# Patient Record
Sex: FEMALE | Race: WHITE | NOT HISPANIC OR LATINO
[De-identification: names, ages, dates, MRNs, and addresses within clinical notes are randomized per-mention and may not be internally consistent; named-entity substitution may affect disease eponyms.]

---

## 2017-06-16 ENCOUNTER — APPOINTMENT (OUTPATIENT)
Dept: ENDOCRINOLOGY | Facility: CLINIC | Age: 77
End: 2017-06-16

## 2017-06-16 VITALS
HEIGHT: 64.5 IN | DIASTOLIC BLOOD PRESSURE: 82 MMHG | SYSTOLIC BLOOD PRESSURE: 130 MMHG | WEIGHT: 119.38 LBS | BODY MASS INDEX: 20.13 KG/M2 | HEART RATE: 62 BPM

## 2017-06-16 RX ORDER — LORAZEPAM 1 MG/1
1 TABLET ORAL
Qty: 30 | Refills: 0 | Status: ACTIVE | COMMUNITY
Start: 2017-05-11

## 2017-06-16 RX ORDER — VALACYCLOVIR 1 G/1
1 TABLET, FILM COATED ORAL
Qty: 30 | Refills: 0 | Status: ACTIVE | COMMUNITY
Start: 2017-05-11

## 2017-06-27 ENCOUNTER — OUTPATIENT (OUTPATIENT)
Dept: OUTPATIENT SERVICES | Facility: HOSPITAL | Age: 77
LOS: 1 days | End: 2017-06-27
Payer: MEDICARE

## 2017-06-27 PROCEDURE — 77080 DXA BONE DENSITY AXIAL: CPT | Mod: 26

## 2017-06-27 PROCEDURE — 76536 US EXAM OF HEAD AND NECK: CPT

## 2017-06-27 PROCEDURE — 76536 US EXAM OF HEAD AND NECK: CPT | Mod: 26

## 2017-06-27 PROCEDURE — 77080 DXA BONE DENSITY AXIAL: CPT

## 2017-06-27 PROCEDURE — 77081 DXA BONE DENSITY APPENDICULR: CPT

## 2017-06-28 LAB
25(OH)D3 SERPL-MCNC: 31.4 NG/ML
HBA1C MFR BLD HPLC: 5.5 %
T3 SERPL-MCNC: 73 NG/DL
T4 FREE SERPL-MCNC: 1.4 NG/DL
TSH SERPL-ACNC: 3.23 UIU/ML

## 2017-07-26 ENCOUNTER — LABORATORY RESULT (OUTPATIENT)
Age: 77
End: 2017-07-26

## 2017-07-26 ENCOUNTER — APPOINTMENT (OUTPATIENT)
Dept: ENDOCRINOLOGY | Facility: CLINIC | Age: 77
End: 2017-07-26

## 2017-07-26 VITALS
DIASTOLIC BLOOD PRESSURE: 81 MMHG | SYSTOLIC BLOOD PRESSURE: 136 MMHG | HEART RATE: 55 BPM | WEIGHT: 119.13 LBS | HEIGHT: 64.5 IN | BODY MASS INDEX: 20.09 KG/M2

## 2017-07-26 DIAGNOSIS — Z13.820 ENCOUNTER FOR SCREENING FOR OSTEOPOROSIS: ICD-10-CM

## 2017-07-26 RX ORDER — MULTIVIT-MIN/FOLIC/VIT K/LYCOP 400-300MCG
25 MCG TABLET ORAL DAILY
Qty: 30 | Refills: 5 | Status: ACTIVE | COMMUNITY
Start: 2017-07-26

## 2017-07-26 RX ORDER — ZOLPIDEM TARTRATE 10 MG/1
10 TABLET ORAL
Qty: 30 | Refills: 0 | Status: DISCONTINUED | COMMUNITY
Start: 2017-04-20 | End: 2017-07-26

## 2017-07-27 LAB
ANION GAP SERPL CALC-SCNC: 25 MMOL/L
BUN SERPL-MCNC: 15 MG/DL
CALCIUM SERPL-MCNC: 10.7 MG/DL
CALCIUM SERPL-MCNC: 10.7 MG/DL
CHLORIDE SERPL-SCNC: 104 MMOL/L
CO2 SERPL-SCNC: 18 MMOL/L
CREAT SERPL-MCNC: 0.76 MG/DL
GLUCOSE SERPL-MCNC: 89 MG/DL
PARATHYROID HORMONE INTACT: 44 PG/ML
POTASSIUM SERPL-SCNC: 4.9 MMOL/L
SODIUM SERPL-SCNC: 147 MMOL/L
T3 SERPL-MCNC: 95 NG/DL
TSH SERPL-ACNC: 0.84 UIU/ML

## 2017-07-31 LAB — PTH RELATED PROT SERPL-MCNC: <1.1 PMOL/L

## 2017-08-04 LAB
ANION GAP SERPL CALC-SCNC: 17 MMOL/L
BUN SERPL-MCNC: 19 MG/DL
CALCIUM SERPL-MCNC: 10 MG/DL
CALCIUM SERPL-MCNC: 10 MG/DL
CHLORIDE SERPL-SCNC: 102 MMOL/L
CO2 SERPL-SCNC: 23 MMOL/L
CREAT SERPL-MCNC: 0.77 MG/DL
GLUCOSE SERPL-MCNC: 92 MG/DL
PARATHYROID HORMONE INTACT: 64 PG/ML
POTASSIUM SERPL-SCNC: 4.6 MMOL/L
SODIUM SERPL-SCNC: 142 MMOL/L
VIT B1 SERPL-MCNC: 116.5 NMOL/L
VIT B12 SERPL-MCNC: 888 PG/ML
VIT B6 SERPL-MCNC: 7 UG/L

## 2017-09-18 ENCOUNTER — RX RENEWAL (OUTPATIENT)
Age: 77
End: 2017-09-18

## 2017-10-20 RX ORDER — ALENDRONATE SODIUM 70 MG/1
70 TABLET ORAL
Qty: 4 | Refills: 3 | Status: DISCONTINUED | COMMUNITY
Start: 2017-08-04 | End: 2017-10-20

## 2017-11-08 LAB
24R-OH-CALCIDIOL SERPL-MCNC: 66.1 PG/ML
25(OH)D3 SERPL-MCNC: 34.2 NG/ML
ANION GAP SERPL CALC-SCNC: 17 MMOL/L
BUN SERPL-MCNC: 18 MG/DL
CALCIUM SERPL-MCNC: 10.2 MG/DL
CALCIUM SERPL-MCNC: 10.2 MG/DL
CHLORIDE SERPL-SCNC: 105 MMOL/L
CO2 SERPL-SCNC: 22 MMOL/L
CREAT SERPL-MCNC: 0.76 MG/DL
GLUCOSE SERPL-MCNC: 111 MG/DL
PARATHYROID HORMONE INTACT: 69 PG/ML
POTASSIUM SERPL-SCNC: 5.1 MMOL/L
SODIUM SERPL-SCNC: 144 MMOL/L
T3 SERPL-MCNC: 81 NG/DL
T4 FREE SERPL-MCNC: 0.9 NG/DL
TSH SERPL-ACNC: 1.39 UIU/ML

## 2017-11-10 ENCOUNTER — APPOINTMENT (OUTPATIENT)
Dept: ENDOCRINOLOGY | Facility: CLINIC | Age: 77
End: 2017-11-10
Payer: MEDICARE

## 2017-11-10 VITALS
WEIGHT: 120 LBS | DIASTOLIC BLOOD PRESSURE: 84 MMHG | HEART RATE: 71 BPM | SYSTOLIC BLOOD PRESSURE: 137 MMHG | BODY MASS INDEX: 20.28 KG/M2

## 2017-11-10 DIAGNOSIS — E83.52 HYPERCALCEMIA: ICD-10-CM

## 2017-11-10 DIAGNOSIS — E21.0 PRIMARY HYPERPARATHYROIDISM: ICD-10-CM

## 2017-11-10 DIAGNOSIS — R26.81 UNSTEADINESS ON FEET: ICD-10-CM

## 2017-11-10 DIAGNOSIS — E03.9 HYPOTHYROIDISM, UNSPECIFIED: ICD-10-CM

## 2017-11-10 LAB
ALP BONE SERPL-MCNC: 13 MCG/L
COLLAGEN CTX SERPL-MCNC: 329 PG/ML
COLLAGEN NTX UR-SCNC: 38 NM BCE/MM CR
CREAT UR-MCNC: 118.9 MG/DL

## 2017-11-10 PROCEDURE — 99214 OFFICE O/P EST MOD 30 MIN: CPT | Mod: GC

## 2017-11-29 ENCOUNTER — OUTPATIENT (OUTPATIENT)
Dept: OUTPATIENT SERVICES | Facility: HOSPITAL | Age: 77
LOS: 1 days | End: 2017-11-29
Payer: MEDICARE

## 2017-11-29 DIAGNOSIS — M81.0 AGE-RELATED OSTEOPOROSIS WITHOUT CURRENT PATHOLOGICAL FRACTURE: ICD-10-CM

## 2017-11-29 PROCEDURE — 96365 THER/PROPH/DIAG IV INF INIT: CPT

## 2017-11-29 RX ORDER — ZOLEDRONIC ACID 5 MG/100ML
5 INJECTION, SOLUTION INTRAVENOUS ONCE
Qty: 0 | Refills: 0 | Status: DISCONTINUED | OUTPATIENT
Start: 2017-11-29 | End: 2017-12-14

## 2017-11-30 ENCOUNTER — TRANSCRIPTION ENCOUNTER (OUTPATIENT)
Age: 77
End: 2017-11-30

## 2017-12-11 ENCOUNTER — APPOINTMENT (OUTPATIENT)
Dept: ORTHOPEDIC SURGERY | Facility: CLINIC | Age: 77
End: 2017-12-11
Payer: MEDICARE

## 2017-12-11 DIAGNOSIS — Z87.39 PERSONAL HISTORY OF OTHER DISEASES OF THE MUSCULOSKELETAL SYSTEM AND CONNECTIVE TISSUE: ICD-10-CM

## 2017-12-11 DIAGNOSIS — Z86.39 PERSONAL HISTORY OF OTHER ENDOCRINE, NUTRITIONAL AND METABOLIC DISEASE: ICD-10-CM

## 2017-12-11 DIAGNOSIS — Z63.4 DISAPPEARANCE AND DEATH OF FAMILY MEMBER: ICD-10-CM

## 2017-12-11 DIAGNOSIS — M25.571 PAIN IN RIGHT ANKLE AND JOINTS OF RIGHT FOOT: ICD-10-CM

## 2017-12-11 PROCEDURE — 99203 OFFICE O/P NEW LOW 30 MIN: CPT

## 2017-12-11 RX ORDER — ZOLEDRONIC ACID 5 MG/100ML
5 INJECTION, SOLUTION INTRAVENOUS
Refills: 0 | Status: ACTIVE | COMMUNITY

## 2017-12-11 SDOH — SOCIAL STABILITY - SOCIAL INSECURITY: DISSAPEARANCE AND DEATH OF FAMILY MEMBER: Z63.4

## 2018-03-13 RX ORDER — ALENDRONATE SODIUM 70 MG/1
70 TABLET ORAL
Qty: 4 | Refills: 2 | Status: DISCONTINUED | COMMUNITY
Start: 2017-11-02 | End: 2018-03-13

## 2018-04-04 ENCOUNTER — APPOINTMENT (OUTPATIENT)
Dept: VASCULAR SURGERY | Facility: CLINIC | Age: 78
End: 2018-04-04
Payer: MEDICARE

## 2018-04-04 DIAGNOSIS — R25.2 CRAMP AND SPASM: ICD-10-CM

## 2018-04-04 DIAGNOSIS — R20.2 PARESTHESIA OF SKIN: ICD-10-CM

## 2018-04-04 PROCEDURE — 99204 OFFICE O/P NEW MOD 45 MIN: CPT

## 2018-04-18 ENCOUNTER — RX RENEWAL (OUTPATIENT)
Age: 78
End: 2018-04-18

## 2018-11-02 ENCOUNTER — APPOINTMENT (OUTPATIENT)
Dept: ENDOCRINOLOGY | Facility: CLINIC | Age: 78
End: 2018-11-02
Payer: MEDICARE

## 2018-11-02 VITALS
WEIGHT: 124 LBS | HEIGHT: 64.5 IN | DIASTOLIC BLOOD PRESSURE: 74 MMHG | SYSTOLIC BLOOD PRESSURE: 145 MMHG | HEART RATE: 73 BPM | BODY MASS INDEX: 20.91 KG/M2

## 2018-11-02 DIAGNOSIS — M81.0 AGE-RELATED OSTEOPOROSIS W/OUT CURRENT PATHOLOGICAL FRACTURE: ICD-10-CM

## 2018-11-02 PROCEDURE — 99215 OFFICE O/P EST HI 40 MIN: CPT

## 2018-11-02 RX ORDER — TRAZODONE HYDROCHLORIDE 50 MG/1
50 TABLET ORAL
Qty: 30 | Refills: 0 | Status: DISCONTINUED | COMMUNITY
Start: 2017-11-13 | End: 2018-11-02

## 2018-11-06 ENCOUNTER — RESULT REVIEW (OUTPATIENT)
Age: 78
End: 2018-11-06

## 2018-11-06 LAB
ALP BONE SERPL-MCNC: 11 MCG/L
COLLAGEN CTX SERPL-MCNC: 117 PG/ML

## 2018-11-06 RX ORDER — LIOTHYRONINE SODIUM 5 UG/1
5 TABLET ORAL DAILY
Qty: 30 | Refills: 1 | Status: DISCONTINUED | COMMUNITY
Start: 2017-06-28 | End: 2018-11-06

## 2018-11-27 RX ORDER — SODIUM CHLORIDE 9 MG/ML
500 INJECTION, SOLUTION INTRAVENOUS
Qty: 0 | Refills: 0 | Status: DISCONTINUED | OUTPATIENT
Start: 2018-11-28 | End: 2018-12-13

## 2018-11-28 ENCOUNTER — APPOINTMENT (OUTPATIENT)
Dept: INFUSION THERAPY | Facility: HOSPITAL | Age: 78
End: 2018-11-28

## 2018-11-28 ENCOUNTER — OUTPATIENT (OUTPATIENT)
Dept: OUTPATIENT SERVICES | Facility: HOSPITAL | Age: 78
LOS: 1 days | End: 2018-11-28
Payer: MEDICARE

## 2018-11-28 VITALS
HEART RATE: 64 BPM | RESPIRATION RATE: 18 BRPM | OXYGEN SATURATION: 99 % | SYSTOLIC BLOOD PRESSURE: 132 MMHG | DIASTOLIC BLOOD PRESSURE: 79 MMHG | TEMPERATURE: 98 F

## 2018-11-28 DIAGNOSIS — M81.0 AGE-RELATED OSTEOPOROSIS WITHOUT CURRENT PATHOLOGICAL FRACTURE: ICD-10-CM

## 2018-11-28 PROCEDURE — 96365 THER/PROPH/DIAG IV INF INIT: CPT

## 2018-11-28 PROCEDURE — 96361 HYDRATE IV INFUSION ADD-ON: CPT

## 2018-11-28 RX ORDER — ACETAMINOPHEN 500 MG
650 TABLET ORAL ONCE
Qty: 0 | Refills: 0 | Status: COMPLETED | OUTPATIENT
Start: 2018-11-28 | End: 2018-11-28

## 2018-11-28 RX ORDER — ZOLEDRONIC ACID 5 MG/100ML
5 INJECTION, SOLUTION INTRAVENOUS ONCE
Qty: 0 | Refills: 0 | Status: COMPLETED | OUTPATIENT
Start: 2018-11-28 | End: 2018-11-28

## 2018-11-28 RX ADMIN — SODIUM CHLORIDE 500 MILLILITER(S): 9 INJECTION, SOLUTION INTRAVENOUS at 12:26

## 2018-11-28 RX ADMIN — Medication 650 MILLIGRAM(S): at 12:24

## 2018-11-28 RX ADMIN — ZOLEDRONIC ACID 200 MILLIGRAM(S): 5 INJECTION, SOLUTION INTRAVENOUS at 12:39

## 2019-04-02 ENCOUNTER — APPOINTMENT (OUTPATIENT)
Dept: HEART AND VASCULAR | Facility: CLINIC | Age: 79
End: 2019-04-02
Payer: MEDICARE

## 2019-04-02 VITALS
HEART RATE: 71 BPM | SYSTOLIC BLOOD PRESSURE: 152 MMHG | WEIGHT: 115.99 LBS | TEMPERATURE: 98.3 F | HEIGHT: 64.5 IN | OXYGEN SATURATION: 98 % | BODY MASS INDEX: 19.56 KG/M2 | DIASTOLIC BLOOD PRESSURE: 90 MMHG

## 2019-04-02 DIAGNOSIS — R07.9 CHEST PAIN, UNSPECIFIED: ICD-10-CM

## 2019-04-02 DIAGNOSIS — R01.1 CARDIAC MURMUR, UNSPECIFIED: ICD-10-CM

## 2019-04-02 PROCEDURE — 93000 ELECTROCARDIOGRAM COMPLETE: CPT

## 2019-04-02 PROCEDURE — 99204 OFFICE O/P NEW MOD 45 MIN: CPT

## 2019-04-02 NOTE — ASSESSMENT
[FreeTextEntry1] : Exertional CP- occurred with her going up very steep stairs with luggage and a shoulder bag.  Will have pt do a stress echo\par \par Murmurs- has an outflow murmur and a pan systolic murmur of MR.  Will assess at time of stress echo.

## 2019-04-02 NOTE — REASON FOR VISIT
[FreeTextEntry1] : 79 y/o F, had to go up 6 flights of stairs to get across some train tracks this past month. Was carrying luggage and a shoulder bag.  Got CP and SOB. + pressure, non rad. Eventually got on the train and reports a cough. CP resolved after an uncertain time frame.  Exercises daily but not as extreme as the stairs she did that day.  No recurrence but did go to the [place] [facility] 1-2 yrs ago where she ruled out for an MI.  \par Denies HTN, DM, \par former smoker 30 yrs, 1 PPD. + HLD, \par \par EKG: NSR, normal axis and intervals, micro q in 3 and F, no ST-Tw abnormalities. 4/2/19

## 2019-04-02 NOTE — PHYSICAL EXAM
[Nail Clubbing] : no clubbing of the fingernails [Cyanosis, Localized] : no localized cyanosis [Petechial Hemorrhages (___cm)] : no petechial hemorrhages [Skin Color & Pigmentation] : normal skin color and pigmentation [No Venous Stasis] : no venous stasis [Skin Lesions] : no skin lesions [No Skin Ulcers] : no skin ulcer [No Xanthoma] : no  xanthoma was observed [General Appearance - Well Developed] : well developed [Normal Appearance] : normal appearance [Well Groomed] : well groomed [General Appearance - Well Nourished] : well nourished [No Deformities] : no deformities [General Appearance - In No Acute Distress] : no acute distress [Normal Conjunctiva] : the conjunctiva exhibited no abnormalities [Eyelids - No Xanthelasma] : the eyelids demonstrated no xanthelasmas [Normal Oral Mucosa] : normal oral mucosa [No Oral Pallor] : no oral pallor [No Oral Cyanosis] : no oral cyanosis [Heart Rate And Rhythm] : heart rate and rhythm were normal [Heart Sounds] : normal S1 and S2 [Murmurs] : no murmurs present [Respiration, Rhythm And Depth] : normal respiratory rhythm and effort [Exaggerated Use Of Accessory Muscles For Inspiration] : no accessory muscle use [Auscultation Breath Sounds / Voice Sounds] : lungs were clear to auscultation bilaterally [Abdomen Soft] : soft [Abdomen Tenderness] : non-tender [] : no hepato-splenomegaly [Abdomen Mass (___ Cm)] : no abdominal mass palpated [Abnormal Walk] : normal gait [Gait - Sufficient For Exercise Testing] : the gait was sufficient for exercise testing [Oriented To Time, Place, And Person] : oriented to person, place, and time [Affect] : the affect was normal [Mood] : the mood was normal [No Anxiety] : not feeling anxious [FreeTextEntry1] : pulses 1-2 +

## 2019-04-11 ENCOUNTER — FORM ENCOUNTER (OUTPATIENT)
Age: 79
End: 2019-04-11

## 2019-04-12 ENCOUNTER — RX CHANGE (OUTPATIENT)
Age: 79
End: 2019-04-12

## 2019-04-12 ENCOUNTER — OUTPATIENT (OUTPATIENT)
Dept: OUTPATIENT SERVICES | Facility: HOSPITAL | Age: 79
LOS: 1 days | End: 2019-04-12
Payer: MEDICARE

## 2019-04-12 DIAGNOSIS — I10 ESSENTIAL (PRIMARY) HYPERTENSION: ICD-10-CM

## 2019-04-12 DIAGNOSIS — R07.9 CHEST PAIN, UNSPECIFIED: ICD-10-CM

## 2019-04-12 PROCEDURE — 93351 STRESS TTE COMPLETE: CPT

## 2019-04-12 PROCEDURE — 93350 STRESS TTE ONLY: CPT | Mod: 26

## 2019-04-12 PROCEDURE — 93018 CV STRESS TEST I&R ONLY: CPT

## 2019-04-12 PROCEDURE — 93016 CV STRESS TEST SUPVJ ONLY: CPT

## 2019-04-12 PROCEDURE — 93320 DOPPLER ECHO COMPLETE: CPT | Mod: 26

## 2019-04-12 PROCEDURE — 93325 DOPPLER ECHO COLOR FLOW MAPG: CPT | Mod: 26

## 2019-04-12 RX ORDER — AMLODIPINE BESYLATE 2.5 MG/1
2.5 TABLET ORAL DAILY
Qty: 90 | Refills: 3 | Status: ACTIVE | COMMUNITY
Start: 2019-04-12 | End: 1900-01-01

## 2019-11-06 ENCOUNTER — APPOINTMENT (OUTPATIENT)
Dept: ENDOCRINOLOGY | Facility: CLINIC | Age: 79
End: 2019-11-06
Payer: MEDICARE

## 2019-11-06 VITALS — HEIGHT: 64 IN | BODY MASS INDEX: 19.74 KG/M2

## 2019-11-06 VITALS
DIASTOLIC BLOOD PRESSURE: 82 MMHG | HEART RATE: 63 BPM | BODY MASS INDEX: 19.43 KG/M2 | SYSTOLIC BLOOD PRESSURE: 137 MMHG | WEIGHT: 115 LBS

## 2019-11-06 PROCEDURE — 99214 OFFICE O/P EST MOD 30 MIN: CPT

## 2019-11-06 RX ORDER — ATORVASTATIN CALCIUM 10 MG/1
10 TABLET, FILM COATED ORAL
Qty: 30 | Refills: 2 | Status: DISCONTINUED | COMMUNITY
Start: 2017-06-16 | End: 2019-11-06

## 2019-11-06 RX ORDER — LEVOTHYROXINE SODIUM 88 UG/1
88 TABLET ORAL
Qty: 30 | Refills: 5 | Status: ACTIVE | COMMUNITY
Start: 2017-06-16 | End: 1900-01-01

## 2019-11-06 NOTE — DATA REVIEWED
[FreeTextEntry1] : 8/19: Ca 10.4, tot chol 206, trig 201, HDL 81, LDL 85, TSH 1.95, A1c 5.4%\par 11/18: BSAP 11, \par 10/18: Ca 10.0, Cr 0.65,\par 6/18: TSH 0.575, tot chol 231, trig 128, HDL 97,  \par 11/17: , BSAP 13, TSH 1.39, 25D 34, 1,25D 66, Ca 10.2\par 7/17: Ca 10.0, PTH 64, Ca 10.7\par \par bone density,  6/17:\par spine T 1.6, not reliable\par fem neck 0.563, T -2.6\par tot hip 0.722, T -1.8\par 1/3 radius 0.518, T -2.9\par UD 0.264, T -3.1\par \par thyroid sono, 6/17: heterogeneous\par R nodule 7mm, no suspicious features

## 2019-11-06 NOTE — PHYSICAL EXAM
[Alert] : alert [Healthy Appearance] : healthy appearance [Normal Voice/Communication] : normal voice communication [No Proptosis] : no proptosis [No Lid Lag] : no lid lag [Normal Hearing] : hearing was normal [Clear to Auscultation] : lungs were clear to auscultation bilaterally [Normal S1, S2] : normal S1 and S2 [Regular Rhythm] : with a regular rhythm [No Spinal Tenderness] : no spinal tenderness [Normal Affect] : the affect was normal [Normal Mood] : the mood was normal [Kyphosis] : no kyphosis present [Scoliosis] : scoliosis not present [Acanthosis Nigricans] : no acanthosis nigricans [de-identified] : thyroid not palpable [de-identified] : 3/6 systolic murmur [de-identified] : mild kyphosis

## 2019-11-06 NOTE — ASSESSMENT
[FreeTextEntry1] : Osteoporosis, s/p 2 infusions of Reclast.  will send for bone density and if bone density not improving, will change therapy to Prolia this year (instead of 3rd dose of Reclast)\par Discussed calcium supplementation with patient.  Total recommended dietary calcium intake is 1200mg  but she only has to supplement what she doesn't get in her diet.   she probably only needs to supplement 500-600mg/day if she has at least 1 serving of dairy. \par check bone markers and 25D today.\par \par Hypothyroid.  If not seeing benefit from liothyronine, can discontinue, since liothyronine can increase heart rate.  continue on synthroid only, 5x/week.  If TSH goes up from stopping liothyronine (and it may not, since her dose is very low), then will increase Synthroid dosing to 6x/week.\par \par Neuropathy.  suggested OTC remedies--alpha lipoic acid, B12, and capsaicin cream.\par RTO 1 year

## 2019-11-06 NOTE — HISTORY OF PRESENT ILLNESS
[FreeTextEntry1] : found to have calcified arteries, and statin was changed from atorvastatin to rosuvastatin\par energy is ok, no fatigue\par no palpitations or feeling jittery \par no dizziness, but balance is not good.  get panicky when at the top of stairs and is very careful walking now\par fell a few months ago and fractured 3 toes, which healed quickly. \par has poor feeling in her toes and neuropathy symptoms (tingling) at night\par has difficulty sleeping (not new)\par c/o hair loss, hair thinning.  was using a hair treatment shampoo that she learned was repackaged Rogaine.\par got flu vaccine\par \par PMH: hypothyroid \par osteoporosis, Reclast 11/17 and 11/18.  did not tolerate alendronate in past.\par multiple fractures--ribs, toes.  no FH of hip fracture.  height loss 1-1.5"\par \par Meds:\par Synthroid SHERRY 88mcg, 5x/week, liothyronine 5mcg am\par rosuvastatin\par amlodipine 2.5mg\par lorazepam 1mg/day (otherwise she would be up all night)\par 2 creams for eczema\par supplements: vitamin C, vit D 1000/day, B2, biotin, elysium, D-ribose, chromium picolonate

## 2019-11-11 LAB
25(OH)D3 SERPL-MCNC: 48.4 NG/ML
ALP BONE SERPL-MCNC: 10 MCG/L
ANION GAP SERPL CALC-SCNC: 16 MMOL/L
BUN SERPL-MCNC: 17 MG/DL
CALCIUM SERPL-MCNC: 11.1 MG/DL
CHLORIDE SERPL-SCNC: 99 MMOL/L
CO2 SERPL-SCNC: 26 MMOL/L
COLLAGEN CTX SERPL-MCNC: 154 PG/ML
CREAT SERPL-MCNC: 0.73 MG/DL
GLUCOSE SERPL-MCNC: 103 MG/DL
POTASSIUM SERPL-SCNC: 4.3 MMOL/L
SODIUM SERPL-SCNC: 141 MMOL/L

## 2020-11-04 ENCOUNTER — APPOINTMENT (OUTPATIENT)
Dept: ENDOCRINOLOGY | Facility: CLINIC | Age: 80
End: 2020-11-04

## 2021-10-06 PROBLEM — I10 ESSENTIAL HYPERTENSION: Status: ACTIVE | Noted: 2019-04-12

## 2023-01-06 ENCOUNTER — APPOINTMENT (OUTPATIENT)
Dept: NEUROLOGY | Facility: CLINIC | Age: 83
End: 2023-01-06